# Patient Record
Sex: FEMALE | Race: WHITE | Employment: FULL TIME | URBAN - METROPOLITAN AREA
[De-identification: names, ages, dates, MRNs, and addresses within clinical notes are randomized per-mention and may not be internally consistent; named-entity substitution may affect disease eponyms.]

---

## 2022-02-25 ENCOUNTER — OFFICE VISIT (OUTPATIENT)
Dept: ORTHOPEDIC SURGERY | Age: 37
End: 2022-02-25
Payer: COMMERCIAL

## 2022-02-25 VITALS — BODY MASS INDEX: 19.16 KG/M2 | WEIGHT: 115 LBS | HEIGHT: 65 IN

## 2022-02-25 DIAGNOSIS — M54.40 BACK PAIN OF LUMBAR REGION WITH SCIATICA: ICD-10-CM

## 2022-02-25 DIAGNOSIS — M54.50 ACUTE MIDLINE LOW BACK PAIN WITHOUT SCIATICA: Primary | ICD-10-CM

## 2022-02-25 PROCEDURE — 99204 OFFICE O/P NEW MOD 45 MIN: CPT | Performed by: ORTHOPAEDIC SURGERY

## 2022-02-25 RX ORDER — CYCLOBENZAPRINE HCL 10 MG
10 TABLET ORAL
Qty: 30 TABLET | Refills: 0 | Status: SHIPPED | OUTPATIENT
Start: 2022-02-25 | End: 2022-04-11

## 2022-02-25 RX ORDER — FAMCICLOVIR 500 MG/1
TABLET ORAL
COMMUNITY
Start: 2022-02-01

## 2022-02-25 RX ORDER — NORETHINDRONE 0.35 MG/1
1 TABLET ORAL DAILY
COMMUNITY
Start: 2022-01-24

## 2022-02-25 RX ORDER — TRETINOIN 0.25 MG/G
CREAM TOPICAL
COMMUNITY
Start: 2022-01-10

## 2022-02-25 RX ORDER — METHYLPREDNISOLONE 4 MG/1
4 TABLET ORAL
Qty: 1 DOSE PACK | Refills: 0 | Status: SHIPPED | OUTPATIENT
Start: 2022-02-25

## 2022-02-25 NOTE — PROGRESS NOTES
ASSESSMENT/PLAN:  Below is the assessment and plan developed based on review of pertinent history, physical exam, labs, studies, and medications. 1. Acute midline low back pain, unspecified whether sciatica present  -     XR SPINE LUMB 2 OR 3 V; Future  2. Back pain of lumbar region with sciatica      No follow-ups on file. Discussion was had the patient regarding her history, physical exam, and radiographical findings. Her imaging today does not show any acute processes to explain her discomfort. More than likely she is experiencing some low back pain due to sprain/strain which is the most common reason for low back pain. We discussed potential treatment options with the understanding that this can take some time to resolve. We discussed to be modifications, physical therapy, anti-inflammatory meds including NSAIDs and Medrol Dosepak, muscle relaxers, and/or MRI. She is already modifying her activities and is keeping herself out of tennis or any extraneous activity at this time. We will get her set up with some physical therapy that she can try it next week after resting over the weekend so that she does not exacerbate her symptoms. We will send her in a Medrol Dosepak and she understands to hold off on any NSAIDs while she is taking the Medrol Dosepak. She can then resume her Aleve after completing the MDP. We will also send her in some Flexeril as well. She understands to use caution when using this as this can cause drowsiness. At this point there is no need for an MRI since she does not have any radicular symptoms or focal weakness or findings on her exam.  We will plan to see her back in 4 weeks time for recheck or sooner if needed    SUBJECTIVE/OBJECTIVE:  Lizeth Sinclair (: 1985) is a 29 y.o. female, patient,here for evaluation of the Back Pain (low back)  . Pleasant 18-year-old female presents today for evaluation of the low back. She is an avid .   Yesterday she was playing doubles. She does not recall any specific inciting event. Towards the end of her game play however she was serving and after she was done with her round she noticed that she had low back pain and was having difficulty leaning over. She tried to play 2 more games but was unable to play due to her discomfort. Last night she did take some Aleve but had minimal relief. She states as long as she is keeping her back neutral she is comfortable. However she tries to flex or extend at the lower back she has increased pain. She denies any radicular symptoms down the bilateral lower extremities. No bowel or bladder incontinence or changes. No perineal/saddle anesthesia. She states when she was younger she did have some low back pain but was given some orthotics for her shoes and this resolved. She denies any other injuries or surgeries to the low back. Physical Exam    General:  Well-nourished well-developed female. Alert and oriented. No acute distress. Pleasant on exam.  Normal affect and mood. While ambulating she keeps her low back neutral.  No assistive devices to ambulate. Lumbar spine:  Skin is intact about the low back and flanks. There is no ecchymosis, lesions, masses, wounds, erythema or warmth. Nontender palpation over the spinous processes. No palpable crepitus or step-off. Mild tenderness over the paraspinal muscles on the right and left side. Nontender over the PSIS or SI joints. No pain with compression of the pelvis. She is able to rotate and side bend symmetrically but limited range of motion. She is able to extend about 5 or 10 degrees with discomfort in the low back. She is able to flex about 5 to 10 degrees with discomfort in the low back that limits her motion. Negative straight leg raise bilaterally. bilateral lower extremities:  Able to freely range the hips, knees ankles and feet without discomfort.   Good strength with resisted hip flexion, extension knee flexion, extension, and ankle dorsiflexion plantarflexion. Sensations intact to light touch bilaterally in all distributions. Motor is intact bilaterally. Palpable DP and PT pulses that are symmetric. No groin or knee pain with internal/external rotation of bilateral hips. Imaging:    AP and lateral of the lumbar spine is negative for any fractures or dislocations. No evidence of early spondylolysis, spondylolisthesis, or spondylosis. No evidence of scoliosis in the lumbar or lower thoracic spine. There is some disc space narrowing at L5-S1. Allergies   Allergen Reactions    Nickel Rash       Current Outpatient Medications   Medication Sig    famciclovir (FAMVIR) 500 mg tablet TAKE 3 TABLETS BY MOUTH DAILY WHEN SYMPTOMS DEVELOP    Incassia 0.35 mg tab Take 1 Tablet by mouth daily.  tretinoin (RETIN-A) 0.025 % topical cream APPLY TO ENTIRE FACE EVERY NIGHT AT BEDTIME, CAN USE OVER MOISTURIZER IF NEEDED FOR DRYNESS     No current facility-administered medications for this visit. Past Medical History:   Diagnosis Date    Anemia        History reviewed. No pertinent surgical history. History reviewed. No pertinent family history. Social History     Socioeconomic History    Marital status:      Spouse name: Not on file    Number of children: Not on file    Years of education: Not on file    Highest education level: Not on file   Occupational History    Not on file   Tobacco Use    Smoking status: Never Smoker    Smokeless tobacco: Never Used   Vaping Use    Vaping Use: Never used   Substance and Sexual Activity    Alcohol use:  Yes    Drug use: Never    Sexual activity: Not on file   Other Topics Concern    Not on file   Social History Narrative    Not on file     Social Determinants of Health     Financial Resource Strain:     Difficulty of Paying Living Expenses: Not on file   Food Insecurity:     Worried About Running Out of Food in the Last Year: Not on file    Ran Out of Food in the Last Year: Not on file   Transportation Needs:     Lack of Transportation (Medical): Not on file    Lack of Transportation (Non-Medical): Not on file   Physical Activity:     Days of Exercise per Week: Not on file    Minutes of Exercise per Session: Not on file   Stress:     Feeling of Stress : Not on file   Social Connections:     Frequency of Communication with Friends and Family: Not on file    Frequency of Social Gatherings with Friends and Family: Not on file    Attends Jew Services: Not on file    Active Member of [address] [place] [address] or Organizations: Not on file    Attends Club or Organization Meetings: Not on file    Marital Status: Not on file   Intimate Partner Violence:     Fear of Current or Ex-Partner: Not on file    Emotionally Abused: Not on file    Physically Abused: Not on file    Sexually Abused: Not on file   Housing Stability:     Unable to Pay for Housing in the Last Year: Not on file    Number of [place] in the Last Year: Not on file    Unstable Housing in the Last Year: Not on file       Review of Systems    No flowsheet data found. Vitals:  Ht 5' 5\" (1.651 m)   Wt 115 lb (52.2 kg)   BMI 19.14 kg/m²    Body mass index is 19.14 kg/m². An electronic signature was used to authenticate this note.   -- Lupe Simental MD

## 2022-03-19 PROBLEM — M54.50 ACUTE MIDLINE LOW BACK PAIN WITHOUT SCIATICA: Status: ACTIVE | Noted: 2022-02-25

## 2022-04-09 DIAGNOSIS — M54.50 ACUTE MIDLINE LOW BACK PAIN WITHOUT SCIATICA: ICD-10-CM

## 2022-04-09 DIAGNOSIS — M54.40 BACK PAIN OF LUMBAR REGION WITH SCIATICA: ICD-10-CM

## 2022-04-11 RX ORDER — CYCLOBENZAPRINE HCL 10 MG
TABLET ORAL
Qty: 30 TABLET | Refills: 0 | Status: SHIPPED | OUTPATIENT
Start: 2022-04-11

## 2022-12-21 ENCOUNTER — HOSPITAL ENCOUNTER (EMERGENCY)
Age: 37
Discharge: HOME | End: 2022-12-21
Attending: EMERGENCY MEDICINE | Admitting: EMERGENCY MEDICINE
Payer: COMMERCIAL

## 2022-12-21 VITALS
TEMPERATURE: 98.2 F | RESPIRATION RATE: 18 BRPM | HEIGHT: 65 IN | DIASTOLIC BLOOD PRESSURE: 82 MMHG | SYSTOLIC BLOOD PRESSURE: 120 MMHG | BODY MASS INDEX: 19.99 KG/M2 | WEIGHT: 120 LBS | OXYGEN SATURATION: 97 % | HEART RATE: 92 BPM

## 2022-12-21 DIAGNOSIS — E86.0 DEHYDRATION: Primary | ICD-10-CM

## 2022-12-21 DIAGNOSIS — R19.7 NAUSEA VOMITING AND DIARRHEA: ICD-10-CM

## 2022-12-21 DIAGNOSIS — R11.2 NAUSEA VOMITING AND DIARRHEA: ICD-10-CM

## 2022-12-21 LAB
ALBUMIN SERPL-MCNC: 4.2 G/DL (ref 3.5–5)
ALBUMIN/GLOB SERPL: 1.5 {RATIO} (ref 1.1–2.2)
ALP SERPL-CCNC: 55 U/L (ref 45–117)
ALT SERPL-CCNC: 27 U/L (ref 12–78)
ANION GAP SERPL CALC-SCNC: 4 MMOL/L (ref 5–15)
AST SERPL-CCNC: 18 U/L (ref 15–37)
BASOPHILS # BLD: 0 K/UL (ref 0–0.1)
BASOPHILS NFR BLD: 0 % (ref 0–1)
BILIRUB SERPL-MCNC: 0.7 MG/DL (ref 0.2–1)
BUN SERPL-MCNC: 19 MG/DL (ref 6–20)
BUN/CREAT SERPL: 24 (ref 12–20)
CALCIUM SERPL-MCNC: 8.5 MG/DL (ref 8.5–10.1)
CHLORIDE SERPL-SCNC: 108 MMOL/L (ref 97–108)
CO2 SERPL-SCNC: 28 MMOL/L (ref 21–32)
COMMENT, HOLDF: NORMAL
CREAT SERPL-MCNC: 0.78 MG/DL (ref 0.55–1.02)
DIFFERENTIAL METHOD BLD: ABNORMAL
EOSINOPHIL # BLD: 0.1 K/UL (ref 0–0.4)
EOSINOPHIL NFR BLD: 1 % (ref 0–7)
ERYTHROCYTE [DISTWIDTH] IN BLOOD BY AUTOMATED COUNT: 13.2 % (ref 11.5–14.5)
GLOBULIN SER CALC-MCNC: 2.8 G/DL (ref 2–4)
GLUCOSE SERPL-MCNC: 99 MG/DL (ref 65–100)
HCT VFR BLD AUTO: 47.2 % (ref 35–47)
HGB BLD-MCNC: 15.5 G/DL (ref 11.5–16)
IMM GRANULOCYTES # BLD AUTO: 0 K/UL (ref 0–0.04)
IMM GRANULOCYTES NFR BLD AUTO: 0 % (ref 0–0.5)
LIPASE SERPL-CCNC: 132 U/L (ref 73–393)
LYMPHOCYTES # BLD: 0.7 K/UL (ref 0.8–3.5)
LYMPHOCYTES NFR BLD: 5 % (ref 12–49)
MCH RBC QN AUTO: 30.8 PG (ref 26–34)
MCHC RBC AUTO-ENTMCNC: 32.8 G/DL (ref 30–36.5)
MCV RBC AUTO: 93.8 FL (ref 80–99)
MONOCYTES # BLD: 0.7 K/UL (ref 0–1)
MONOCYTES NFR BLD: 5 % (ref 5–13)
NEUTS SEG # BLD: 11.7 K/UL (ref 1.8–8)
NEUTS SEG NFR BLD: 89 % (ref 32–75)
NRBC # BLD: 0 K/UL (ref 0–0.01)
NRBC BLD-RTO: 0 PER 100 WBC
PLATELET # BLD AUTO: 275 K/UL (ref 150–400)
PMV BLD AUTO: 10 FL (ref 8.9–12.9)
POTASSIUM SERPL-SCNC: 3.6 MMOL/L (ref 3.5–5.1)
PROT SERPL-MCNC: 7 G/DL (ref 6.4–8.2)
RBC # BLD AUTO: 5.03 M/UL (ref 3.8–5.2)
RBC MORPH BLD: ABNORMAL
SAMPLES BEING HELD,HOLD: NORMAL
SODIUM SERPL-SCNC: 140 MMOL/L (ref 136–145)
WBC # BLD AUTO: 13.2 K/UL (ref 3.6–11)

## 2022-12-21 PROCEDURE — 96361 HYDRATE IV INFUSION ADD-ON: CPT

## 2022-12-21 PROCEDURE — 85025 COMPLETE CBC W/AUTO DIFF WBC: CPT

## 2022-12-21 PROCEDURE — 36415 COLL VENOUS BLD VENIPUNCTURE: CPT

## 2022-12-21 PROCEDURE — 83690 ASSAY OF LIPASE: CPT

## 2022-12-21 PROCEDURE — 74011250636 HC RX REV CODE- 250/636: Performed by: PHYSICIAN ASSISTANT

## 2022-12-21 PROCEDURE — 74011250636 HC RX REV CODE- 250/636: Performed by: EMERGENCY MEDICINE

## 2022-12-21 PROCEDURE — 96374 THER/PROPH/DIAG INJ IV PUSH: CPT

## 2022-12-21 PROCEDURE — 80053 COMPREHEN METABOLIC PANEL: CPT

## 2022-12-21 PROCEDURE — 99284 EMERGENCY DEPT VISIT MOD MDM: CPT

## 2022-12-21 RX ORDER — DICYCLOMINE HYDROCHLORIDE 10 MG/1
20 CAPSULE ORAL
Status: DISCONTINUED | OUTPATIENT
Start: 2022-12-21 | End: 2022-12-21

## 2022-12-21 RX ORDER — ONDANSETRON 4 MG/1
4 TABLET, ORALLY DISINTEGRATING ORAL
Qty: 8 TABLET | Refills: 0 | Status: SHIPPED | OUTPATIENT
Start: 2022-12-21

## 2022-12-21 RX ORDER — ONDANSETRON 4 MG/1
4 TABLET, ORALLY DISINTEGRATING ORAL
Status: COMPLETED | OUTPATIENT
Start: 2022-12-21 | End: 2022-12-21

## 2022-12-21 RX ORDER — KETOROLAC TROMETHAMINE 30 MG/ML
30 INJECTION, SOLUTION INTRAMUSCULAR; INTRAVENOUS
Status: COMPLETED | OUTPATIENT
Start: 2022-12-21 | End: 2022-12-21

## 2022-12-21 RX ADMIN — SODIUM CHLORIDE 1000 ML: 9 INJECTION, SOLUTION INTRAVENOUS at 21:51

## 2022-12-21 RX ADMIN — ONDANSETRON 4 MG: 4 TABLET, ORALLY DISINTEGRATING ORAL at 21:51

## 2022-12-21 RX ADMIN — KETOROLAC TROMETHAMINE 30 MG: 30 INJECTION, SOLUTION INTRAMUSCULAR; INTRAVENOUS at 21:51

## 2022-12-22 NOTE — ED TRIAGE NOTES
Pt thinks she has food poisoning after eating a salad at 12:30 pm with shrimp- and the  shrimp tasted funny.   Pt has had constant n/v/d with abd cramps since 3 pm. Pt also reports breast feeding a 9week old

## 2022-12-22 NOTE — DISCHARGE INSTRUCTIONS
Return for new or worsening symptoms. As discussed, your symptoms are likely foodborne, although there is still a possibility that it could be a viral illness. Try to stick to a gentle diet tomorrow, and advance your diet gently as you begin to feel better. Drink plenty of liquids. Lots of handwashing at home.

## 2022-12-22 NOTE — ED PROVIDER NOTES
40year old female, 7 weeks postpartum, presenting for N/V/D. \"I think it's food poisoning. \"  Pt notes that she ate a salad with shrimp at lunch, notes that something maybe tasted funny, a few hours later started with vomiting and diarrhea. No one sick at home. TNC episodes of emesis. Thinks it is calming down. Unsure of blood. More episodes of diarrhea than vomiting. Dark spots, but no gross blood. No UTI symptoms. + chills. No tx PTA. OB: Dr. Pau Hernandez.  + intermittent abdominal cramps, a little better recently. + lightheaded. PMhx: anemia  Psx: wisdom teth  Social: non-smoker. Social alcohol. 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 Diarrhea   Associated symptoms include diarrhea, nausea, vomiting and headaches. Pertinent negatives include no dysuria and no chest pain. Abdominal Pain   Associated symptoms include diarrhea, nausea, vomiting and headaches. Pertinent negatives include no dysuria and no chest pain. Past Medical History:   Diagnosis Date    Anemia        No past surgical history on file. No family history on file.     Social History     Socioeconomic History    Marital status:      Spouse name: Not on file    Number of children: Not on file    Years of education: Not on file    Highest education level: Not on file   Occupational History    Not on file   Tobacco Use    Smoking status: Never    Smokeless tobacco: Never   Vaping Use    Vaping Use: Never used   Substance and Sexual Activity    Alcohol use: Yes    Drug use: Never    Sexual activity: Not on file   Other Topics Concern    Not on file   Social History Narrative    Not on file     Social Determinants of Health     Financial Resource Strain: Not on file   Food Insecurity: Not on file   Transportation Needs: Not on file   Physical Activity: Not on file   Stress: Not on file   Social Connections: Not on file   Intimate Partner Violence: Not on file   Housing Stability: Not on file         ALLERGIES: Nickel    Review of Systems   Constitutional:  Positive for chills. HENT:  Negative for facial swelling. Eyes:  Negative for visual disturbance. Respiratory:  Negative for shortness of breath. Cardiovascular:  Negative for chest pain. Gastrointestinal:  Positive for abdominal pain, diarrhea, nausea and vomiting. Genitourinary:  Negative for dysuria. Musculoskeletal:  Negative for neck stiffness. Skin:  Negative for wound. Neurological:  Positive for light-headedness and headaches. Negative for syncope. All other systems reviewed and are negative. Vitals:    12/21/22 2027   BP: 120/82   Pulse: 92   Resp: 18   Temp: 98.2 °F (36.8 °C)   SpO2: 97%   Weight: 54.4 kg (120 lb)   Height: 5' 5\" (1.651 m)            Physical Exam  Vitals and nursing note reviewed. Constitutional:       General: She is not in acute distress. Appearance: She is well-developed. Comments: Pleasant, appears to not feel well, no acute distress   HENT:      Head: Normocephalic and atraumatic. Right Ear: External ear normal.      Left Ear: External ear normal.   Eyes:      General: No scleral icterus. Conjunctiva/sclera: Conjunctivae normal.   Neck:      Trachea: No tracheal deviation. Cardiovascular:      Rate and Rhythm: Normal rate and regular rhythm. Heart sounds: Normal heart sounds. No murmur heard. No friction rub. No gallop. Pulmonary:      Effort: Pulmonary effort is normal. No respiratory distress. Breath sounds: Normal breath sounds. No stridor. No wheezing. Abdominal:      General: There is no distension. Palpations: Abdomen is soft. Tenderness: There is no abdominal tenderness. Musculoskeletal:         General: Normal range of motion. Cervical back: Neck supple. Skin:     General: Skin is warm and dry.    Neurological:      Mental Status: She is alert and oriented to person, place, and time. Psychiatric:         Behavior: Behavior normal.        MDM  Number of Diagnoses or Management Options  Dehydration  Nausea vomiting and diarrhea  Diagnosis management comments: Fluids and40year-old female presenting to the ED for acute onset of nausea, vomiting, diarrhea with abdominal cramps and chills that started this afternoon, patient does note that prior to onset she ate a salad with shrimp that may have tasted a little bit unusual.  Abdomen soft and nontender. Reassuring labs and vital signs. Patient feeling much better after occasions in the ED. Discussed likely foodborne illness, discussed possibility of viral illness as well. Recommended symptomatic and supportive care, return precautions given. Amount and/or Complexity of Data Reviewed  Clinical lab tests: reviewed and ordered  Discuss the patient with other providers: yes (Dr. Maddie Ambriz ED attending)           Procedures            Patient reassessed, reports overall feeling significantly better. Nausea much improved as has headache.   REY Álvarez  10:33 PM

## 2024-08-02 ENCOUNTER — HOSPITAL ENCOUNTER (OUTPATIENT)
Facility: HOSPITAL | Age: 39
Discharge: HOME | End: 2024-08-02
Attending: OTOLARYNGOLOGY
Payer: COMMERCIAL

## 2024-08-02 DIAGNOSIS — H90.3 SENSORINEURAL HEARING LOSS, BILATERAL: ICD-10-CM

## 2024-08-02 DIAGNOSIS — Z78.9 NON-SMOKER: ICD-10-CM

## 2024-08-02 PROCEDURE — A9579 GAD-BASE MR CONTRAST NOS,1ML: HCPCS | Performed by: OTOLARYNGOLOGY

## 2024-08-02 PROCEDURE — 70553 MRI BRAIN STEM W/O & W/DYE: CPT

## 2024-08-02 PROCEDURE — 6360000004 HC RX CONTRAST MEDICATION: Performed by: OTOLARYNGOLOGY

## 2024-08-02 RX ADMIN — GADOTERIDOL 12 ML: 279.3 INJECTION, SOLUTION INTRAVENOUS at 13:49
